# Patient Record
Sex: FEMALE | Race: WHITE | NOT HISPANIC OR LATINO | URBAN - METROPOLITAN AREA
[De-identification: names, ages, dates, MRNs, and addresses within clinical notes are randomized per-mention and may not be internally consistent; named-entity substitution may affect disease eponyms.]

---

## 2017-11-19 ENCOUNTER — EMERGENCY (EMERGENCY)
Facility: HOSPITAL | Age: 42
LOS: 1 days | Discharge: HOME | End: 2017-11-19
Attending: EMERGENCY MEDICINE | Admitting: EMERGENCY MEDICINE
Payer: COMMERCIAL

## 2017-11-19 VITALS
OXYGEN SATURATION: 97 % | HEIGHT: 63 IN | DIASTOLIC BLOOD PRESSURE: 70 MMHG | WEIGHT: 125 LBS | SYSTOLIC BLOOD PRESSURE: 102 MMHG | TEMPERATURE: 99 F | HEART RATE: 74 BPM | RESPIRATION RATE: 18 BRPM

## 2017-11-19 PROCEDURE — 73630 X-RAY EXAM OF FOOT: CPT

## 2017-11-19 PROCEDURE — 99283 EMERGENCY DEPT VISIT LOW MDM: CPT | Mod: 25

## 2017-11-19 PROCEDURE — 99283 EMERGENCY DEPT VISIT LOW MDM: CPT

## 2017-11-19 PROCEDURE — 73630 X-RAY EXAM OF FOOT: CPT | Mod: 26,LT

## 2017-11-19 NOTE — ED STATDOCS - MUSCULOSKELETAL, MLM
Plantar surface adjacent to the thrid and fourth toes bruising and tenderness no deformity neurovascularly intact

## 2017-11-19 NOTE — ED STATDOCS - ATTENDING CONTRIBUTION TO CARE
I, Teo Howell, performed the initial face to face bedside interview with this patient regarding history of present illness, review of symptoms and relevant past medical, social and family history.  I completed an independent physical examination.  I was the initial provider who evaluated this patient. I have signed out the follow up of any pending tests (i.e. labs, radiological studies) to the ACP.  I have communicated the patient’s plan of care and disposition with the ACP.

## 2017-11-19 NOTE — ED STATDOCS - OBJECTIVE STATEMENT
43 y/o F pt presents to ED c/o throbbing sharp left toe pain s/p tripping on a sharp toy a few days ago. Denies PMHx. Denies falling. Denies N/V/D, fever, chills, SOB, CP, and  abdominal pain. No further complaints at this time.

## 2017-11-19 NOTE — ED STATDOCS - PROGRESS NOTE DETAILS
PA NOTE: Pt seen by intake physician and hpi/orders/plan reviewed. PT presenting to ED with complaints of left toe pain s/p tripping over a toy 2 days ago  PE: GEN: Awake, alert,  NAD,  EYES: PERRL CARDIAC: Reg rate and rhythm, S1,S2, RRR  RESP: No distress noted. Lungs CTA bilaterally no wheeze, ronchi, rales. ABD: soft,  non-tender, no guarding. . NEURO: AOx3, no focal deficits   PLAN: xray

## 2017-11-19 NOTE — ED ADULT TRIAGE NOTE - CHIEF COMPLAINT QUOTE
tripped over child ramp on Friday, and stumped 2nd toe on the left foot. hasn't been getting better and I think I broke it

## 2017-11-20 PROBLEM — Z00.00 ENCOUNTER FOR PREVENTIVE HEALTH EXAMINATION: Status: ACTIVE | Noted: 2017-11-20

## 2019-02-13 ENCOUNTER — APPOINTMENT (OUTPATIENT)
Dept: NEUROLOGY | Facility: CLINIC | Age: 44
End: 2019-02-13
Payer: COMMERCIAL

## 2019-02-13 VITALS
WEIGHT: 130 LBS | SYSTOLIC BLOOD PRESSURE: 108 MMHG | BODY MASS INDEX: 23.33 KG/M2 | HEIGHT: 62.5 IN | DIASTOLIC BLOOD PRESSURE: 60 MMHG | HEART RATE: 61 BPM

## 2019-02-13 DIAGNOSIS — Z86.79 PERSONAL HISTORY OF OTHER DISEASES OF THE CIRCULATORY SYSTEM: ICD-10-CM

## 2019-02-13 DIAGNOSIS — Z82.49 FAMILY HISTORY OF ISCHEMIC HEART DISEASE AND OTHER DISEASES OF THE CIRCULATORY SYSTEM: ICD-10-CM

## 2019-02-13 DIAGNOSIS — Z83.3 FAMILY HISTORY OF DIABETES MELLITUS: ICD-10-CM

## 2019-02-13 DIAGNOSIS — Z78.9 OTHER SPECIFIED HEALTH STATUS: ICD-10-CM

## 2019-02-13 DIAGNOSIS — Z80.9 FAMILY HISTORY OF MALIGNANT NEOPLASM, UNSPECIFIED: ICD-10-CM

## 2019-02-13 DIAGNOSIS — Z81.8 FAMILY HISTORY OF OTHER MENTAL AND BEHAVIORAL DISORDERS: ICD-10-CM

## 2019-02-13 PROCEDURE — 99244 OFF/OP CNSLTJ NEW/EST MOD 40: CPT

## 2019-02-13 NOTE — DISCUSSION/SUMMARY
[FreeTextEntry1] : Ms. Hester is a 43 year old woman with episodic vertigo and attention issues.\par She had a VNG suggesting a central cause of vertigo.\par \par 1. Vertigo - She has a non-focal neurological examination.\par VNG was suggestive of a central cause of vertigo.\par She does not have any history suggestive of a neurological disorder such as Multiple Sclerosis.\par Nevertheless, I will order MRI brain and IACs without contrast to rule out any significant pathology that may be contributing to vertigo.\par She can continue with home exercises provided in vestibular therapy and use meclizine as needed.\par \par 2. ADHD - She has a long history of attention disturbance and hyperactivity. She has a brother and son with ADHD. Neurotrax will be performed to see if she has a pattern suggestive of ADHD.\par \par F/U 1 month, sooner if needed.

## 2019-02-13 NOTE — DATA REVIEWED
[de-identified] : VNG 1/14/19: Failure to fixate during right cool and left warm irrigation is indicative of central nervous system pathology.

## 2019-02-13 NOTE — CONSULT LETTER
[Dear  ___] : Dear  [unfilled], [Consult Letter:] : I had the pleasure of evaluating your patient, [unfilled]. [Please see my note below.] : Please see my note below. [Consult Closing:] : Thank you very much for allowing me to participate in the care of this patient.  If you have any questions, please do not hesitate to contact me. [FreeTextEntry3] : Sincerely,\par \par \par Bailey Brooke MD\par Diplomate, American Academy of Psychiatry and Neurology\par Board Certified in the Subspecialty of Clinical Neurophysiology\par Board Certified in the Subspecialty of Sleep Medicine\par Board Certified in the Subspecialty of Epilepsy\par

## 2019-02-13 NOTE — HISTORY OF PRESENT ILLNESS
[FreeTextEntry1] : She reports that after she had children she believes that she came down with vertigo. This started in 2009.\par It started after taking Allegra D for allergies. It felt like everything was moving.\par Despite stopping Allegra she continued to have bouts of vertigo. She was prescribed meclizine which helped. If she starts medications right away the spells go away after about two days. She never had a problem with motion sickness in the past but now she has to take meclizine for flights. \par She is not typically sensitive to motion unless she is in a bout of vertigo.\par A few weeks ago she started having vertigo and it was not abolished with Nasonex, Zyrtec or meclizine. \par She went to see a vestibular therapist. He recommended an ENT. She had a VNG which was suggestive of a central cause.\par \par She tends to get vertigo in allergy season if she does not take her medication, if she has congestion from a URI or if she flies. She describes it as a feeling if things are still moving when she is not moving. She describes it as feeling drunk. She has felt her eyes flicker when she is lying down during an episode of vertigo. \par She denies having hearing loss or tinnitus.\par \par She used to have migraines as a kid. \par Sometimes she has flickers of light which she wonders if they are ocular migraines. \par \par She has not diagnosed with ADHD. Her brother and her son are diagnosed. She believes that she has it as well. She has tried her son's medications and felt that they were helpful. \par She has difficulty sitting through meetings. She fidgets. 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

## 2019-02-22 ENCOUNTER — FORM ENCOUNTER (OUTPATIENT)
Age: 44
End: 2019-02-22

## 2019-02-23 ENCOUNTER — OUTPATIENT (OUTPATIENT)
Dept: OUTPATIENT SERVICES | Facility: HOSPITAL | Age: 44
LOS: 1 days | End: 2019-02-23
Payer: COMMERCIAL

## 2019-02-23 ENCOUNTER — APPOINTMENT (OUTPATIENT)
Dept: MRI IMAGING | Facility: CLINIC | Age: 44
End: 2019-02-23
Payer: COMMERCIAL

## 2019-02-23 DIAGNOSIS — R42 DIZZINESS AND GIDDINESS: ICD-10-CM

## 2019-02-23 PROCEDURE — 70551 MRI BRAIN STEM W/O DYE: CPT | Mod: 26

## 2019-02-23 PROCEDURE — 70551 MRI BRAIN STEM W/O DYE: CPT

## 2019-03-18 ENCOUNTER — APPOINTMENT (OUTPATIENT)
Dept: NEUROLOGY | Facility: CLINIC | Age: 44
End: 2019-03-18
Payer: COMMERCIAL

## 2019-03-18 VITALS
WEIGHT: 132 LBS | SYSTOLIC BLOOD PRESSURE: 106 MMHG | HEART RATE: 82 BPM | DIASTOLIC BLOOD PRESSURE: 56 MMHG | HEIGHT: 62.5 IN | BODY MASS INDEX: 23.68 KG/M2

## 2019-03-18 DIAGNOSIS — F90.9 ATTENTION-DEFICIT HYPERACTIVITY DISORDER, UNSPECIFIED TYPE: ICD-10-CM

## 2019-03-18 DIAGNOSIS — R41.3 OTHER AMNESIA: ICD-10-CM

## 2019-03-18 DIAGNOSIS — R42 DIZZINESS AND GIDDINESS: ICD-10-CM

## 2019-03-18 PROCEDURE — 99214 OFFICE O/P EST MOD 30 MIN: CPT

## 2019-03-18 PROCEDURE — 96132 NRPSYC TST EVAL PHYS/QHP 1ST: CPT | Mod: 59

## 2019-03-18 NOTE — DATA REVIEWED
[de-identified] : MRI brain 2/23/19:\par Normal noncontrast brain MRI.\par No abnormal signal or filling defect within either IAC.\par No abnormal signal or filling defect within either membranous labyrinth. [de-identified] : Neurotrax 3/18/19:\par Global cognitive score 98.3\par Memory 90.8\par Executive Function 107.4\par Attention 97.2\par Information Processing Speed 98.6\par Visual Spatial 103.9\par Verbal Function 96.2\par Motor Skills 93.7\par  [de-identified] : VNG 1/14/19: Failure to fixate during right cool and left warm irrigation is indicative of central nervous system pathology

## 2019-03-18 NOTE — DISCUSSION/SUMMARY
[FreeTextEntry1] : Ms. Hester is a 43 year old woman with episodic vertigo and attention issues.\par She had a VNG suggesting a central cause of vertigo.\par \par 1. Vertigo - She has a non-focal neurological examination.\par VNG was suggestive of a central cause of vertigo. However, MRI brain with attention to IACs is normal. \par Continue home vestibular exercises.\par Continue use of meclizine as needed.\par \par \par 2. Attention Disturbance - Neurotrax testing today does show some issues with attention. However, memory was a relative weakness.\par Will check TFTs and vitamin B12 to look for any underlying causes of memory impairment.\par Trial of Vyvanse 20 mg for attention disturbance.\par We discussed some common side effects of stimulants including appetite suppression, anxiety, palpitations, insomnia.\par \par  Follow-up in one month, sooner if needed.\par

## 2019-03-18 NOTE — PROCEDURE
[FreeTextEntry1] : Neurotrax 3/18/19:\par Global cognitive score 98.3\par Memory 90.8\par Executive Function 107.4\par Attention 97.2\par Information Processing Speed 98.6\par Visual Spatial 103.9\par Verbal Function 96.2\par Motor Skills 93.7\par \par Below average in Memory, Attention, Information Processing Speed, Verbal Function, Motor Skills\par Above Average in Executive Function and Visual Spatial\par More than 1 standard deviation below average in no domains.

## 2019-03-18 NOTE — HISTORY OF PRESENT ILLNESS
[FreeTextEntry1] : Ms. Hester was last seen on 19.\par She recently travelled to [place]. She went to [employer]. \par She took Meclizine for the rides and the airplane. She still feels as if she is moving. \par \par Prior to her trip she had one additional episode of vertigo, which responded to Claritin.\par \par She also says that since having a  section she has areas on her abdomen where she has absent sensation. Sometimes she has itching or burning pain near the abdomen. \par \par She continues to feel that attention issues are a problem. She brought it up to people at work and they agreed that she had issues.\par Her son is on Focalin XR 10 mg. \par She has tried taking her son's immediate release stimulants in the past. She did feel a pressure in the front of her head. She was more efficient throughout the day but she also felt more emotional. \par She says that when she was pregnant she had difficulty with word finding with common words. This has persisted.

## 2019-03-27 ENCOUNTER — OTHER (OUTPATIENT)
Age: 44
End: 2019-03-27

## 2019-04-15 ENCOUNTER — APPOINTMENT (OUTPATIENT)
Dept: NEUROLOGY | Facility: CLINIC | Age: 44
End: 2019-04-15

## 2019-08-23 ENCOUNTER — OTHER (OUTPATIENT)
Age: 44
End: 2019-08-23

## 2019-11-12 RX ORDER — LISDEXAMFETAMINE DIMESYLATE 20 MG/1
20 CAPSULE ORAL
Qty: 30 | Refills: 0 | Status: ACTIVE | COMMUNITY
Start: 2019-03-18 | End: 1900-01-01

## 2021-06-29 NOTE — ED ADULT TRIAGE NOTE - AS HEIGHT TYPE
stated [employer] [facility]:  I discussed with the patient the risks of mycophenolate mofetil including but not limited to infection/immunosuppression, GI upset, hypokalemia, hypercholesterolemia, bone marrow suppression, lymphoproliferative disorders, malignancy, GI ulceration/bleed/perforation, colitis, interstitial lung disease, kidney failure, progressive multifocal leukoencephalopathy, and birth defects.  The patient understands that monitoring is required including a baseline creatinine and regular CBC testing. In addition, patient must alert us immediately if symptoms of infection or other concerning signs are noted.

## 2021-09-02 ENCOUNTER — EMERGENCY (EMERGENCY)
Facility: HOSPITAL | Age: 46
LOS: 1 days | Discharge: HOME | End: 2021-09-02
Attending: EMERGENCY MEDICINE
Payer: COMMERCIAL

## 2021-09-02 VITALS
RESPIRATION RATE: 18 BRPM | DIASTOLIC BLOOD PRESSURE: 86 MMHG | WEIGHT: 160.06 LBS | SYSTOLIC BLOOD PRESSURE: 108 MMHG | HEART RATE: 97 BPM | HEIGHT: 63 IN | OXYGEN SATURATION: 96 % | TEMPERATURE: 98 F

## 2021-09-02 PROCEDURE — 99284 EMERGENCY DEPT VISIT MOD MDM: CPT

## 2021-09-02 PROCEDURE — 99283 EMERGENCY DEPT VISIT LOW MDM: CPT

## 2021-09-02 RX ORDER — METHOCARBAMOL 500 MG/1
2 TABLET, FILM COATED ORAL
Qty: 18 | Refills: 0
Start: 2021-09-02 | End: 2021-09-04

## 2021-09-02 RX ORDER — IBUPROFEN 200 MG
600 TABLET ORAL ONCE
Refills: 0 | Status: COMPLETED | OUTPATIENT
Start: 2021-09-02 | End: 2021-09-02

## 2021-09-02 RX ORDER — IBUPROFEN 200 MG
1 TABLET ORAL
Qty: 9 | Refills: 0
Start: 2021-09-02 | End: 2021-09-04

## 2021-09-02 RX ORDER — LIDOCAINE 4 G/100G
1 CREAM TOPICAL ONCE
Refills: 0 | Status: COMPLETED | OUTPATIENT
Start: 2021-09-02 | End: 2021-09-02

## 2021-09-02 RX ADMIN — Medication 600 MILLIGRAM(S): at 15:38

## 2021-09-02 RX ADMIN — LIDOCAINE 1 PATCH: 4 CREAM TOPICAL at 15:38
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
## 2021-09-02 NOTE — ED PROVIDER NOTE - PATIENT PORTAL LINK FT
You can access the FollowMyHealth Patient Portal offered by [facility] by registering at the following website: [email or web address]. By joining [employer]’s FollowMyHealth portal, you will also be able to view your health information using other applications (apps) compatible with our system.
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
## 2021-09-02 NOTE — ED PROVIDER NOTE - NSFOLLOWUPCLINICS_GEN_ALL_ED_FT
[facility] - [facility]  Ortho/Spine  [address] [place] [id number]  Phone: [phone]  Fax:   Follow Up Time: 4-6 Days
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
